# Patient Record
Sex: FEMALE | URBAN - METROPOLITAN AREA
[De-identification: names, ages, dates, MRNs, and addresses within clinical notes are randomized per-mention and may not be internally consistent; named-entity substitution may affect disease eponyms.]

---

## 2024-09-16 ENCOUNTER — NURSE TRIAGE (OUTPATIENT)
Dept: NURSING | Facility: CLINIC | Age: 49
End: 2024-09-16

## 2024-09-16 NOTE — TELEPHONE ENCOUNTER
Pt calls requesting clinic appointment today. States she was triaged prior and told to make a clinic appointment or go to ED. Offered triage services this morning and was declined. [place] [facility] [place] [facility] and to [place] back during office hours.     Reason for Disposition   Requesting regular office appointment    Protocols used: Information Only Call - No Triage-A-AH